# Patient Record
(demographics unavailable — no encounter records)

---

## 2025-02-14 NOTE — PHYSICAL EXAM
[Normal Breath Sounds] : Normal breath sounds [2+] : left 2+ [de-identified] : NAD [de-identified] : Irregular [FreeTextEntry1] : LUE incision almost closed. Two 2cm areas of superficial skin opening. Staples and sutures were removed.

## 2025-02-14 NOTE — ASSESSMENT
[FreeTextEntry1] : In summary, Mrs. Khan presents s/p excision of LUE arteriovenous graft.   Wound care instructions: -Wash daily with soap/water or saline -Apply dry gauze on top of wound, apply Kerlix from hand to axilla -Apply ACE bandage from hand to axilla  -Continue anticoagulation for DVT -Follow up in three months for LUE venous duplex and vein mapping BUE -Continue HD via femoral HD catheter

## 2025-02-14 NOTE — HISTORY OF PRESENT ILLNESS
[FreeTextEntry1] : Mrs. 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 Surgery was performed on 1/28/25. While hospitalized, she was also treated for LUE axillary DVT. The wound was left partially open and it is healing. She is receiving VNS three times per week. She denies any LUE complaints.   PMH: severe pulmonary HTN, ESRD on HD, DMII, a fib.  Medications: Metoprolol, Eliquis, Cinacalcet, Linagliptin, and Hydralazine  All: NKDA